# Patient Record
Sex: MALE | Race: ASIAN | NOT HISPANIC OR LATINO | Employment: UNEMPLOYED | URBAN - METROPOLITAN AREA
[De-identification: names, ages, dates, MRNs, and addresses within clinical notes are randomized per-mention and may not be internally consistent; named-entity substitution may affect disease eponyms.]

---

## 2024-06-24 ENCOUNTER — OFFICE VISIT (OUTPATIENT)
Dept: FAMILY MEDICINE | Facility: CLINIC | Age: 64
End: 2024-06-24
Payer: MEDICAID

## 2024-06-24 VITALS
TEMPERATURE: 99 F | SYSTOLIC BLOOD PRESSURE: 145 MMHG | OXYGEN SATURATION: 94 % | DIASTOLIC BLOOD PRESSURE: 78 MMHG | RESPIRATION RATE: 16 BRPM | HEART RATE: 76 BPM

## 2024-06-24 DIAGNOSIS — K02.9 DENTAL CARIES: Primary | ICD-10-CM

## 2024-06-24 PROCEDURE — 99203 OFFICE O/P NEW LOW 30 MIN: CPT | Performed by: NURSE PRACTITIONER

## 2024-06-24 RX ORDER — AMOXICILLIN 500 MG/1
500 CAPSULE ORAL 2 TIMES DAILY
Qty: 14 CAPSULE | Refills: 0 | Status: SHIPPED | OUTPATIENT
Start: 2024-06-24 | End: 2024-07-01

## 2024-06-24 NOTE — PROGRESS NOTES
Assessment & Plan     Dental caries    - amoxicillin (AMOXIL) 500 MG capsule  Dispense: 14 capsule; Refill: 0       Patient with severe dental caries, loose teeth, dental pain.  No facial swelling.  No fevers.  Due to severity of symptoms, will start him on amoxicillin.  Does have Tylenol at home to take.  Did provide handout with dental clinic address.  Patient's cousin is also here with some basic English knowledge and can help schedule as soon as possible.  Explained he will need to see a dentist even if he is taking the antibiotics.    Patient needs PCP.  Did have  go into the room with [employer]  to help him schedule an exam as there are no labs or any other information in the chart.  Patient is not aware of any past medical history at this time.          Return in about 1 week (around 7/1/2024).    [facility]    Dillan Manuel is a 64 year old male who presents to clinic today for the following health issues:  Chief Complaint   Patient presents with    Dental Pain     Sx has started for 2 months. Upper and bottom gum pain. Bleeding and hard to eat.       HPI    Gum pain, bleeding and tenderness for the last 2 months. Has never seen a dentist in the [place].  Doesn't think face is swollen.        Due to language barrier, an  was present during the history-taking and subsequent discussion (and for part of the physical exam) with this patient.            Review of Systems        Objective    BP (!) 145/78   Pulse 76   Temp 99  F (37.2  C) (Oral)   Resp 16   SpO2 94%   Physical Exam  HENT:      Mouth/Throat:      Mouth: Mucous membranes are moist.      Dentition: Abnormal dentition. Dental tenderness, gingival swelling and dental caries present.        Comments: Loose tooth indicated above.  Severe dental caries, gum loss throughout.    No facial swelling.

## 2024-07-01 ENCOUNTER — OFFICE VISIT (OUTPATIENT)
Dept: FAMILY MEDICINE | Facility: CLINIC | Age: 64
End: 2024-07-01
Payer: COMMERCIAL

## 2024-07-01 VITALS
HEART RATE: 71 BPM | WEIGHT: 159.4 LBS | SYSTOLIC BLOOD PRESSURE: 129 MMHG | OXYGEN SATURATION: 100 % | BODY MASS INDEX: 27.21 KG/M2 | TEMPERATURE: 98 F | HEIGHT: 64 IN | DIASTOLIC BLOOD PRESSURE: 83 MMHG

## 2024-07-01 DIAGNOSIS — K02.9 DENTAL CARIES: Primary | ICD-10-CM

## 2024-07-01 PROCEDURE — 99213 OFFICE O/P EST LOW 20 MIN: CPT | Performed by: NURSE PRACTITIONER

## 2024-07-01 RX ORDER — AMOXICILLIN 500 MG/1
500 CAPSULE ORAL 2 TIMES DAILY
Qty: 14 CAPSULE | Refills: 0 | Status: SHIPPED | OUTPATIENT
Start: 2024-07-01

## 2024-07-01 NOTE — PROGRESS NOTES
"  Assessment & Plan     (K02.9) Dental caries  (primary encounter diagnosis)  Comment: The patient has severe dental caries and a loose tooth, with significant gum loss.  Plan: amoxicillin (AMOXIL) 500 MG capsule, Dental         Referral    -Recommended an  dental consultation for evaluation and possible extraction or other appropriate dental treatment.  -Educated the patient on the importance of oral hygiene and regular dental visits to prevent further dental issues.  -Discussed potential use of pain management strategies, such as over-the-counter analgesics (NSAIDS or Tylenol), if needed.      MED REC REQUIRED  Post Medication Reconciliation Status:     BMI  Estimated body mass index is 27.79 kg/m  as calculated from the following:    Height as of this encounter: 1.613 m (5' 3.5\").    Weight as of this encounter: 72.3 kg (159 lb 6.4 oz).             Dillan Manuel is a 64 year old, presenting for the following health issues:  [facility] F/U      7/1/2024    10:33 AM   Additional Questions   Roomed by Cristiana Fields MA   Accompanied by support             Kaleb ching  is a 64-year-old male with a significant history of severe dental caries and loose teeth, presenting for follow-up of persistent dental pain. He reports that the pain has been ongoing and has impacted his ability to eat comfortably. Previously, he was prescribed amoxicillin to manage the infection but has not yet initiated the medication. The patient requests that the prescription be sent to his pharmacy. He denies any associated facial swelling or fever. Despite the severity of his dental symptoms, he has declined recommended preventative care measures, including lipid screening and a colonoscopy.    Review of Systems  Constitutional, HEENT, cardiovascular, pulmonary, GI, , musculoskeletal, neuro, skin, endocrine and psych systems are negative, except as otherwise noted.      Objective    /83 (BP Location: Right arm, Patient Position: Chair, " "Cuff Size: Adult Regular)   Pulse 71   Temp 98  F (36.7  C) (Oral)   Ht 1.613 m (5' 3.5\")   Wt 72.3 kg (159 lb 6.4 oz)   SpO2 100%   BMI 27.79 kg/m    Body mass index is 27.79 kg/m .  Physical Exam  Constitutional:       Appearance: Normal appearance.   HENT:      Head: Normocephalic and atraumatic.      Nose: Nose normal.      Mouth/Throat:      Mouth: Mucous membranes are moist.        Comments: Loose tooth noted as indicated above.  Severe dental caries present.  Significant gum loss throughout the oral cavity.  No facial swelling observed.      Eyes:      Extraocular Movements: Extraocular movements intact.   Cardiovascular:      Rate and Rhythm: Normal rate and regular rhythm.      Pulses: Normal pulses.      Heart sounds: Normal heart sounds.   Pulmonary:      Effort: Pulmonary effort is normal.      Breath sounds: Normal breath sounds.   Abdominal:      General: Bowel sounds are normal.      Palpations: Abdomen is soft.   Musculoskeletal:      Cervical back: Normal range of motion and neck supple.      Right lower leg: No edema.      Left lower leg: No edema.   Skin:     Capillary Refill: Capillary refill takes less than 2 seconds.      Findings: No lesion or rash.   Neurological:      Mental Status: He is alert and oriented to person, place, and time.   Psychiatric:         Mood and Affect: Mood normal.         Behavior: Behavior normal.         Thought Content: Thought content normal.         Judgment: Judgment normal.                    Signed Electronically by: JA Thorne CNP    "

## 2024-12-11 ENCOUNTER — OFFICE VISIT (OUTPATIENT)
Dept: FAMILY MEDICINE | Facility: CLINIC | Age: 64
End: 2024-12-11
Payer: COMMERCIAL

## 2024-12-11 VITALS
TEMPERATURE: 97.6 F | HEIGHT: 64 IN | DIASTOLIC BLOOD PRESSURE: 82 MMHG | RESPIRATION RATE: 20 BRPM | OXYGEN SATURATION: 97 % | WEIGHT: 142.04 LBS | SYSTOLIC BLOOD PRESSURE: 131 MMHG | HEART RATE: 66 BPM | BODY MASS INDEX: 24.25 KG/M2

## 2024-12-11 DIAGNOSIS — Z01.84 IMMUNITY STATUS TESTING: ICD-10-CM

## 2024-12-11 DIAGNOSIS — R63.0 POOR APPETITE: ICD-10-CM

## 2024-12-11 DIAGNOSIS — Z78.9 NON-ENGLISH SPEAKING PATIENT: ICD-10-CM

## 2024-12-11 DIAGNOSIS — R63.4 WEIGHT LOSS: ICD-10-CM

## 2024-12-11 DIAGNOSIS — G47.9 DIFFICULTY SLEEPING: ICD-10-CM

## 2024-12-11 DIAGNOSIS — Z23 NEED FOR VACCINATION: ICD-10-CM

## 2024-12-11 DIAGNOSIS — R63.1 POLYDIPSIA: Primary | ICD-10-CM

## 2024-12-11 DIAGNOSIS — Z11.59 NEED FOR HEPATITIS C SCREENING TEST: ICD-10-CM

## 2024-12-11 DIAGNOSIS — Z11.4 SCREENING FOR HIV (HUMAN IMMUNODEFICIENCY VIRUS): ICD-10-CM

## 2024-12-11 DIAGNOSIS — E11.65 TYPE 2 DIABETES MELLITUS WITH HYPERGLYCEMIA, WITHOUT LONG-TERM CURRENT USE OF INSULIN (H): ICD-10-CM

## 2024-12-11 DIAGNOSIS — R35.89 POLYURIA: ICD-10-CM

## 2024-12-11 LAB
ALBUMIN UR-MCNC: NEGATIVE MG/DL
APPEARANCE UR: CLEAR
BASOPHILS # BLD AUTO: 0 10E3/UL (ref 0–0.2)
BASOPHILS NFR BLD AUTO: 1 %
BILIRUB UR QL STRIP: NEGATIVE
COLOR UR AUTO: YELLOW
EOSINOPHIL # BLD AUTO: 0.1 10E3/UL (ref 0–0.7)
EOSINOPHIL NFR BLD AUTO: 1 %
ERYTHROCYTE [DISTWIDTH] IN BLOOD BY AUTOMATED COUNT: 12.8 % (ref 10–15)
EST. AVERAGE GLUCOSE BLD GHB EST-MCNC: 378 MG/DL
GLUCOSE UR STRIP-MCNC: >=1000 MG/DL
HBA1C MFR BLD: 14.8 % (ref 0–5.6)
HCT VFR BLD AUTO: 44.8 % (ref 40–53)
HGB BLD-MCNC: 16.5 G/DL (ref 13.3–17.7)
HGB UR QL STRIP: NEGATIVE
IMM GRANULOCYTES # BLD: 0 10E3/UL
IMM GRANULOCYTES NFR BLD: 0 %
KETONES UR STRIP-MCNC: NEGATIVE MG/DL
LEUKOCYTE ESTERASE UR QL STRIP: NEGATIVE
LYMPHOCYTES # BLD AUTO: 1.2 10E3/UL (ref 0.8–5.3)
LYMPHOCYTES NFR BLD AUTO: 27 %
MCH RBC QN AUTO: 31.1 PG (ref 26.5–33)
MCHC RBC AUTO-ENTMCNC: 36.8 G/DL (ref 31.5–36.5)
MCV RBC AUTO: 85 FL (ref 78–100)
MONOCYTES # BLD AUTO: 0.3 10E3/UL (ref 0–1.3)
MONOCYTES NFR BLD AUTO: 7 %
NEUTROPHILS # BLD AUTO: 2.9 10E3/UL (ref 1.6–8.3)
NEUTROPHILS NFR BLD AUTO: 64 %
NITRATE UR QL: NEGATIVE
PH UR STRIP: 5.5 [PH] (ref 5–7)
PLATELET # BLD AUTO: 132 10E3/UL (ref 150–450)
RBC # BLD AUTO: 5.3 10E6/UL (ref 4.4–5.9)
SP GR UR STRIP: 1.01 (ref 1–1.03)
UROBILINOGEN UR STRIP-ACNC: 0.2 E.U./DL
WBC # BLD AUTO: 4.5 10E3/UL (ref 4–11)

## 2024-12-11 PROCEDURE — 86762 RUBELLA ANTIBODY: CPT | Performed by: FAMILY MEDICINE

## 2024-12-11 PROCEDURE — 90673 RIV3 VACCINE NO PRESERV IM: CPT | Performed by: FAMILY MEDICINE

## 2024-12-11 PROCEDURE — 83036 HEMOGLOBIN GLYCOSYLATED A1C: CPT | Performed by: FAMILY MEDICINE

## 2024-12-11 PROCEDURE — 99214 OFFICE O/P EST MOD 30 MIN: CPT | Mod: 25 | Performed by: FAMILY MEDICINE

## 2024-12-11 PROCEDURE — 99000 SPECIMEN HANDLING OFFICE-LAB: CPT | Performed by: FAMILY MEDICINE

## 2024-12-11 PROCEDURE — 80053 COMPREHEN METABOLIC PANEL: CPT | Performed by: FAMILY MEDICINE

## 2024-12-11 PROCEDURE — 86708 HEPATITIS A ANTIBODY: CPT | Performed by: FAMILY MEDICINE

## 2024-12-11 PROCEDURE — 91320 SARSCV2 VAC 30MCG TRS-SUC IM: CPT | Performed by: FAMILY MEDICINE

## 2024-12-11 PROCEDURE — 36415 COLL VENOUS BLD VENIPUNCTURE: CPT | Performed by: FAMILY MEDICINE

## 2024-12-11 PROCEDURE — 86735 MUMPS ANTIBODY: CPT | Performed by: FAMILY MEDICINE

## 2024-12-11 PROCEDURE — 86317 IMMUNOASSAY INFECTIOUS AGENT: CPT | Mod: 90 | Performed by: FAMILY MEDICINE

## 2024-12-11 PROCEDURE — 81003 URINALYSIS AUTO W/O SCOPE: CPT | Performed by: FAMILY MEDICINE

## 2024-12-11 PROCEDURE — 86765 RUBEOLA ANTIBODY: CPT | Performed by: FAMILY MEDICINE

## 2024-12-11 PROCEDURE — 90480 ADMN SARSCOV2 VAC 1/ONLY CMP: CPT | Performed by: FAMILY MEDICINE

## 2024-12-11 PROCEDURE — 86803 HEPATITIS C AB TEST: CPT | Performed by: FAMILY MEDICINE

## 2024-12-11 PROCEDURE — 86706 HEP B SURFACE ANTIBODY: CPT | Performed by: FAMILY MEDICINE

## 2024-12-11 PROCEDURE — 90471 IMMUNIZATION ADMIN: CPT | Performed by: FAMILY MEDICINE

## 2024-12-11 PROCEDURE — 84443 ASSAY THYROID STIM HORMONE: CPT | Performed by: FAMILY MEDICINE

## 2024-12-11 PROCEDURE — 87389 HIV-1 AG W/HIV-1&-2 AB AG IA: CPT | Performed by: FAMILY MEDICINE

## 2024-12-11 PROCEDURE — 86787 VARICELLA-ZOSTER ANTIBODY: CPT | Performed by: FAMILY MEDICINE

## 2024-12-11 PROCEDURE — 85025 COMPLETE CBC W/AUTO DIFF WBC: CPT | Performed by: FAMILY MEDICINE

## 2024-12-11 PROCEDURE — 82248 BILIRUBIN DIRECT: CPT | Performed by: FAMILY MEDICINE

## 2024-12-11 PROCEDURE — 80061 LIPID PANEL: CPT | Performed by: FAMILY MEDICINE

## 2024-12-11 NOTE — PATIENT INSTRUCTIONS
-Thank you for choosing the [facility].  -It was a pleasure to see you today.  -Please take a look at the information below for more specific details regarding the treatment plan and recommendations.  -In this after visit summary is a list of your medications and specific instructions.  Please review this carefully as there may be changes made to your medication list.  -If there are any particular questions or concerns, please feel free to reach out to Dr. Rocha.  -If any labs have been completed, we will reach out to you about results.  If the results are normal or not concerning, a letter or [employer] message will be sent to you.  If any follow-up is needed, either Dr. Rocha or the nurse will give you a call.  If you have not heard regarding results after 2 weeks, please reach out to the clinic.    Patient Instructions:    -Complete labs as recommended.   -Stay well hydrated.   -Follow a balanced diet.   -Medications will be recommended after the labs.     Please seek immediate medical attention (go to the emergency room or urgent care) for the following reasons: worsening symptoms, nausea, vomiting, abdominal pain, fevers, chills, or any concerning changes.      --------------------------------------------------------------------------------------------------------------------    -We are always looking for ways to improve.  You may be selected to receive a survey regarding your visit today.  We encourage you to complete the survey and provide specific, constructive feedback to help us improve our processes.  Thank you for your time!  -Please review the contact information listed on the after visit summary and in the electronic chart.  Below is the phone number that we have on file.  If there are any changes that are needed to be made, please reach out to the clinic.  [phone] (home)

## 2024-12-11 NOTE — PROGRESS NOTES
OFFICE VISIT    Assessment/Plan:     Patient Instructions:    -Complete labs as recommended.   -Stay well hydrated.   -Follow a balanced diet.   -Medications will be recommended after the labs.     Please seek immediate medical attention (go to the emergency room or urgent care) for the following reasons: worsening symptoms, nausea, vomiting, abdominal pain, fevers, chills, or any concerning changes.      Kaleb was seen today for establish care.  Diagnoses and all orders for this visit:    Polydipsia  Polyuria  Poor appetite  Difficulty sleeping  Weight loss  Non-English speaking patient: Noted to have new onset diabetes. Started on metformin and empagliflozin. No signs of DKA/HHS.   -     Hemoglobin A1c; Future  -     Basic metabolic panel; Future  -     CBC with Platelets & Differential; Future  -     Hepatic function panel; Future  -     Lipid panel reflex to direct LDL Non-fasting; Future  -     TSH with free T4 reflex; Future  -     UA Macroscopic with reflex to Microscopic and Culture; Future    Need for hepatitis C screening test  -     Hepatitis C Screen Reflex to HCV RNA Quant and Genotype; Future    Screening for HIV (human immunodeficiency virus)  -     HIV Antigen Antibody Combo; Future    Immunity status testing  -     Mumps Immune Status, IgG; Future  -     Rubella Antibody IgG; Future  -     Rubeola Antibody IgG; Future  -     Hepatitis B Surface Antibody; Future  -     Varicella Zoster Virus Antibody IgG; Future  -     Diphtheria tetanus antibody panel; Future  -     Hepatitis A Antibody Total; Future    Need for vaccination  -     INFLUENZA VACCINE TRIVALENT(FLUBLOK)  -     COVID-19 12+ (PFIZER)        Return in about 3 months (around 3/11/2025) for Annual Physical, Medication follow up.    The diagnoses, treatment options, risk, benefits, and recommendations were reviewed with patient/guardian.  Questions were answered to patient's/guardian satisfaction.  Red flag signs were reviewed.   Patient/guardian is in agreement with above plan.      Subjective: 64 year old male with history of dental caries who presents to clinic for the following complaints:   Patient presents with:  Establish Care:     Answers submitted by the patient for this visit:  General Questionnaire (Submitted on 12/11/2024)  Chief Complaint: Chronic problems general questions HPI Form  General Concern (Submitted on 12/11/2024)  Chief Complaint: Chronic problems general questions HPI Form  What is the reason for your visit today?: Dry mouth and lips as well as poor appetite  When did your symptoms begin?: More than a month  Are your symptoms:: Staying the same  Questionnaire about: Chronic problems general questions HPI Form (Submitted on 12/11/2024)  Chief Complaint: Chronic problems general questions HPI Form      Patient lived in [place] before moving to [place].  The patient moved to [place] about 10 months ago now.   CHRISTOFER signed.     Symptoms started in 05/2024. Patient complains of having poor appetite, unable to sleep at night and losing weight.  He is experiencing dry lips/mouth and is thirsty daily. He has peeing and drinking constantly at night.     Denies any pain (other than teeth related), nausea, vomiting, fevers, chills, night sweats, coughing, shortness of breath, peeing more often, or other changes from baseline.       He was told that he has high blood sugars, though he never got checked out so he never got medications. He doesn't remember where he had been seen previously.      Medical history:   -Denies history of CAD, hypertension.      Medications: None.      Surgical history:   -Denies previous surgery.      Family history:  -Denies family history of CAD, hypertension.   DM: most of his siblings have diabetes (sister)  He has six siblings.     Immunization History   Administered Date(s) Administered    COVID-19 12+ (Pfizer) 12/11/2024    Influenza Vaccine Trivalent (FluBlok) 12/11/2024       Patient does not  "have any previous records. He rarely gets vaccinations.       A professional [employer] telephone  was utilized for the office visit.     The 10 point review of system is negative except as stated in the HPI.    Allergies were reviewed and updated.    Objective:   /82   Pulse 66   Temp 97.6  F (36.4  C) (Oral)   Resp 20   Ht 1.62 m (5' 3.78\")   Wt 64.4 kg (142 lb 0.6 oz)   SpO2 97%   BMI 24.55 kg/m    General: Active, alert, nontoxic-appearing.  No acute distress.  HEENT: Normocephalic, atraumatic.  Pupils are equal and round.  Sclera is clear.  Normal external ears. Nares patent.  Moist mucous membranes.    Cardiac: RRR.  S1, S2 present.  No murmurs, rubs, or gallops.  Respiratory/chest: Clear to auscultation bilaterally.  No wheezes, rales, rhonchi.  Breathing is not labored.  No accessory muscle usage.  Abdomen: Soft, nondistended, nontender.  No masses or organomegaly noted.  No guarding or rebound tenderness appreciated.  Extremities: Voluntary movements intact.  Integumentary: No concerning rash or skin changes appreciated.        Alexandro Rocha MD  [facility] [place] [address]  Phone: [phone]  Fax: [phone]    12/16/2024  7:28 AM            Current Outpatient Medications   Medication Sig Dispense Refill    empagliflozin (JARDIANCE) 25 MG TABS tablet Take 0.5 tablets (12.5 mg) by mouth daily for 28 days, THEN 1 tablet (25 mg) daily. 90 tablet 3    metFORMIN (GLUCOPHAGE XR) 500 MG 24 hr tablet Take 1 tablet (500 mg) by mouth daily (with dinner) for 7 days, THEN 2 tablets (1,000 mg) daily (with dinner) for 7 days, THEN 3 tablets (1,500 mg) daily (with dinner) for 7 days, THEN 4 tablets (2,000 mg) daily (with dinner). 360 tablet 3     No current facility-administered medications for this visit.       No Known Allergies    Patient Active Problem List    Diagnosis Date Noted    Need for hepatitis B vaccination 12/12/2024     Priority: Medium     " Nonimmune based on titers from 12/11/2024.      Non-English speaking patient 12/11/2024     Priority: Medium    Dental caries 07/01/2024     Priority: Medium       Family History   Problem Relation Age of Onset    Diabetes Sister     Coronary Artery Disease No family hx of     Hypertension No family hx of        Past Surgical History:   Procedure Laterality Date    No previous surgery          Social History     Socioeconomic History    Marital status:      Spouse name: Not on file    Number of children: Not on file    Years of education: Not on file    Highest education level: Not on file   Occupational History    Not on file   Tobacco Use    Smoking status: Never     Passive exposure: Never    Smokeless tobacco: Never   Vaping Use    Vaping status: Never Used   Substance and Sexual Activity    Alcohol use: Not on file    Drug use: Not on file    Sexual activity: Not on file   Other Topics Concern    Not on file   Social History Narrative    Not on file     Social Drivers of Health     Financial Resource Strain: Low Risk  (7/1/2024)    Financial Resource Strain     Within the past 12 months, have you or your family members you live with been unable to get utilities (heat, electricity) when it was really needed?: No   Food Insecurity: Low Risk  (7/1/2024)    Food Insecurity     Within the past 12 months, did you worry that your food would run out before you got money to buy more?: No     Within the past 12 months, did the food you bought just not last and you didn t have money to get more?: No   Transportation Needs: Low Risk  (7/1/2024)    Transportation Needs     Within the past 12 months, has lack of transportation kept you from medical appointments, getting your medicines, non-medical meetings or appointments, work, or from getting things that you need?: No   Physical Activity: Not on file   Stress: Not on file   Social Connections: Not on file   Interpersonal Safety: Low Risk  (7/1/2024)    Interpersonal  Safety     Do you feel physically and emotionally safe where you currently live?: Yes     Within the past 12 months, have you been hit, slapped, kicked or otherwise physically hurt by someone?: No     Within the past 12 months, have you been humiliated or emotionally abused in other ways by your partner or ex-partner?: No   Housing Stability: Low Risk  (7/1/2024)    Housing Stability     Do you have housing? : Yes     Are you worried about losing your housing?: No

## 2024-12-12 ENCOUNTER — TELEPHONE (OUTPATIENT)
Dept: FAMILY MEDICINE | Facility: CLINIC | Age: 64
End: 2024-12-12
Payer: COMMERCIAL

## 2024-12-12 DIAGNOSIS — Z23 NEED FOR HEPATITIS B VACCINATION: ICD-10-CM

## 2024-12-12 DIAGNOSIS — E11.65 TYPE 2 DIABETES MELLITUS WITH HYPERGLYCEMIA, WITHOUT LONG-TERM CURRENT USE OF INSULIN (H): Primary | ICD-10-CM

## 2024-12-12 LAB
ALBUMIN SERPL BCG-MCNC: 4.3 G/DL (ref 3.5–5.2)
ALP SERPL-CCNC: 159 U/L (ref 40–150)
ALT SERPL W P-5'-P-CCNC: 97 U/L (ref 0–70)
ANION GAP SERPL CALCULATED.3IONS-SCNC: 9 MMOL/L (ref 7–15)
AST SERPL W P-5'-P-CCNC: 84 U/L (ref 0–45)
BILIRUB DIRECT SERPL-MCNC: <0.2 MG/DL (ref 0–0.3)
BILIRUB SERPL-MCNC: 0.6 MG/DL
BUN SERPL-MCNC: 13.2 MG/DL (ref 8–23)
CALCIUM SERPL-MCNC: 9.4 MG/DL (ref 8.8–10.4)
CHLORIDE SERPL-SCNC: 96 MMOL/L (ref 98–107)
CHOLEST SERPL-MCNC: 188 MG/DL
CREAT SERPL-MCNC: 0.75 MG/DL (ref 0.67–1.17)
EGFRCR SERPLBLD CKD-EPI 2021: >90 ML/MIN/1.73M2
FASTING STATUS PATIENT QL REPORTED: NO
FASTING STATUS PATIENT QL REPORTED: NO
GLUCOSE SERPL-MCNC: 616 MG/DL (ref 70–99)
HAV AB SER QL IA: REACTIVE
HBV SURFACE AB SERPL IA-ACNC: 8.17 M[IU]/ML
HBV SURFACE AB SERPL IA-ACNC: NONREACTIVE M[IU]/ML
HCO3 SERPL-SCNC: 25 MMOL/L (ref 22–29)
HCV AB SERPL QL IA: NONREACTIVE
HDLC SERPL-MCNC: 57 MG/DL
HIV 1+2 AB+HIV1 P24 AG SERPL QL IA: NONREACTIVE
LDLC SERPL CALC-MCNC: 90 MG/DL
MEV IGG SER IA-ACNC: 168 AU/ML
MEV IGG SER IA-ACNC: POSITIVE
MUMPS ANTIBODY IGG INSTRUMENT VALUE: >300 AU/ML
MUV IGG SER QL IA: POSITIVE
NONHDLC SERPL-MCNC: 131 MG/DL
POTASSIUM SERPL-SCNC: 4 MMOL/L (ref 3.4–5.3)
PROT SERPL-MCNC: 7.6 G/DL (ref 6.4–8.3)
RUBV IGG SERPL QL IA: 27.5 INDEX
RUBV IGG SERPL QL IA: POSITIVE
SODIUM SERPL-SCNC: 130 MMOL/L (ref 135–145)
TRIGL SERPL-MCNC: 207 MG/DL
TSH SERPL DL<=0.005 MIU/L-ACNC: 1.8 UIU/ML (ref 0.3–4.2)
VZV IGG SER QL IA: 23.1 S/CO
VZV IGG SER QL IA: POSITIVE

## 2024-12-12 RX ORDER — METFORMIN HYDROCHLORIDE 500 MG/1
TABLET, EXTENDED RELEASE ORAL
Qty: 360 TABLET | Refills: 3 | Status: SHIPPED | OUTPATIENT
Start: 2024-12-12 | End: 2026-01-01

## 2024-12-12 NOTE — TELEPHONE ENCOUNTER
See other telephone encounter from today.     Alexandro Rocha MD  [facility] [place] [address]  Phone: [phone]  Fax: [phone]    12/12/2024  9:12 AM

## 2024-12-12 NOTE — TELEPHONE ENCOUNTER
"Writer attempt #1 to call patient with the help of a \"Mary\"   (ID # [id number]) regarding clinician's message below. No answer, left non-detailed voicemail, with clinic call back number.     If patient / family calls back, please relay clinician's message to them. Thanks.    ROGELIO ForbesN, RN, PHN   [facility]    "

## 2024-12-12 NOTE — TELEPHONE ENCOUNTER
Date/time of call received from lab: 12/12/24 at 8:17 AM.    Lab test:  Glucose     Lab value:  616    Ordering provider name: Dr. Alexandro Rocha     Ordering provider department: Family Medicine/Primary Care     Primary Care Provider: No Ref-Primary, Physician     Result managed by: Clinic Hours: Primary Care clinic RN staff. Was test ordered in Primary Care?: Yes, ordered in Primary Care Primary Care: route telephone encounter high priority to ordering provider and ordering provider's clinic Nurse bradley Villanueva, MSN, RN   Triage Nurse   [facility]

## 2024-12-12 NOTE — LETTER
December 17, 2024      [facility]  [address] [place] [address]       Val [facility],    I hope you have been well since our last visit. Below are the results from the testing completed at the visit.     The test shows that your sugars are very high and you have really bad diabetes.  The kidney functions are normal overall.  Dr. Rocha recommends starting to different pill medications to help with controlling the blood sugars.     The liver is also being slightly injured, which could be related to the high sugars.    You are not immune to hepatitis B and are recommended to complete the 3 shot hepatitis B vaccine series when you are ready.  Otherwise, you have immunity to the other common illnesses where vaccines are available.    The other labs are within normal or in the good range.    Dr. Rocha recommends that you continue on the plan as discussed in clinic.    If there are any questions or concerns, please call the clinic or schedule an appointment for follow up.     Best wishes,       Alexandro Rocha MD  [facility]

## 2024-12-12 NOTE — TELEPHONE ENCOUNTER
Team - please call patient with results.   Mail letter if unable to get a hold of patient.     Val Manuel Genny,    I hope you have been well since our last visit. Below are the results from the testing completed at the visit.     The test shows that your sugars are very high and you have really bad diabetes.  The kidney functions are normal overall.  Dr. Rocha recommends starting to different pill medications to help with controlling the blood sugars.     The liver is also being slightly injured, which could be related to the high sugars.    You are not immune to hepatitis B and are recommended to complete the 3 shot hepatitis B vaccine series when you are ready.  Otherwise, you have immunity to the other common illnesses where vaccines are available.    The other labs are within normal or in the good range.    Dr. Rocha recommends that you continue on the plan as discussed in clinic.    If there are any questions or concerns, please call the clinic or schedule an appointment for follow up.     Best wishes,       Alexandro Rocha MD  [facility]  12/12/2024  9:07 AM         Called patient with assistance of an  with  ID: [id number].  No answer. Message left on the phone.  Test result mailed to address on file per protocol after 3rd unsuccessful attempt.    Virgil Hull RN  [facility]

## 2024-12-13 LAB
C DIPHTHERIAE IGG SER-ACNC: 3.6 IU/ML
C TETANI TOXOID IGG SERPL IA-ACNC: 0.4 IU/ML

## 2024-12-13 NOTE — TELEPHONE ENCOUNTER
"Writer attempt #2 to call patient with the help of a \"Mary\"  ID# [id number]  regarding clinician's message below. VM is not set-up yet, so unable to leave VM.    If patient calls back, please relay clinician's message to them. Thanks.    Marvel Massey, ROGELION, RN, PHN   [facility]    "

## 2024-12-16 PROBLEM — E11.9 DIABETES MELLITUS, TYPE 2 (H): Status: ACTIVE | Noted: 2024-12-16

## 2025-03-27 ENCOUNTER — TELEPHONE (OUTPATIENT)
Dept: FAMILY MEDICINE | Facility: CLINIC | Age: 65
End: 2025-03-27

## 2025-03-27 NOTE — TELEPHONE ENCOUNTER
Patient Quality Outreach    Patient is due for the following:   Diabetes -  A1C  Physical Annual Wellness Visit    Action(s) Taken:   Schedule a Annual Wellness Visit    Type of outreach:    Phone, left message for patient/parent to call back.    Questions for provider review:    None         Mishel Jimenez MA  Chart routed to .